# Patient Record
Sex: FEMALE | Race: WHITE | Employment: PART TIME | URBAN - METROPOLITAN AREA
[De-identification: names, ages, dates, MRNs, and addresses within clinical notes are randomized per-mention and may not be internally consistent; named-entity substitution may affect disease eponyms.]

---
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
## 2021-07-04 ENCOUNTER — APPOINTMENT (OUTPATIENT)
Dept: CT IMAGING | Age: 21
End: 2021-07-04
Attending: EMERGENCY MEDICINE
Payer: COMMERCIAL

## 2021-07-04 ENCOUNTER — APPOINTMENT (OUTPATIENT)
Dept: GENERAL RADIOLOGY | Age: 21
End: 2021-07-04
Attending: EMERGENCY MEDICINE
Payer: COMMERCIAL

## 2021-07-04 ENCOUNTER — HOSPITAL ENCOUNTER (EMERGENCY)
Age: 21
Discharge: HOME | End: 2021-07-04
Attending: EMERGENCY MEDICINE
Payer: COMMERCIAL

## 2021-07-04 VITALS
HEIGHT: 64 IN | TEMPERATURE: 100.5 F | WEIGHT: 275 LBS | RESPIRATION RATE: 16 BRPM | HEART RATE: 113 BPM | OXYGEN SATURATION: 96 % | DIASTOLIC BLOOD PRESSURE: 64 MMHG | BODY MASS INDEX: 46.95 KG/M2 | SYSTOLIC BLOOD PRESSURE: 107 MMHG

## 2021-07-04 DIAGNOSIS — I26.94 MULTIPLE SUBSEGMENTAL PULMONARY EMBOLI WITHOUT ACUTE COR PULMONALE (HCC): Primary | ICD-10-CM

## 2021-07-04 LAB
ALBUMIN SERPL-MCNC: 4 G/DL (ref 3.5–5)
ALBUMIN/GLOB SERPL: 1.1 {RATIO} (ref 1.2–3.5)
ALP SERPL-CCNC: 64 U/L (ref 50–136)
ALT SERPL-CCNC: 30 U/L (ref 12–65)
ANION GAP SERPL CALC-SCNC: 7 MMOL/L (ref 7–16)
AST SERPL-CCNC: 17 U/L (ref 15–37)
ATRIAL RATE: 105 BPM
BASOPHILS # BLD: 0 K/UL (ref 0–0.2)
BASOPHILS NFR BLD: 0 % (ref 0–2)
BILIRUB SERPL-MCNC: 0.7 MG/DL (ref 0.2–1.1)
BNP SERPL-MCNC: 28 PG/ML (ref 5–125)
BUN SERPL-MCNC: 17 MG/DL (ref 6–23)
CALCIUM SERPL-MCNC: 9.3 MG/DL (ref 8.3–10.4)
CALCULATED P AXIS, ECG09: 38 DEGREES
CALCULATED R AXIS, ECG10: 67 DEGREES
CALCULATED T AXIS, ECG11: 8 DEGREES
CHLORIDE SERPL-SCNC: 103 MMOL/L (ref 98–107)
CO2 SERPL-SCNC: 27 MMOL/L (ref 21–32)
COVID-19 RAPID TEST, COVR: NOT DETECTED
CREAT SERPL-MCNC: 0.78 MG/DL (ref 0.6–1)
DIAGNOSIS, 93000: NORMAL
DIFFERENTIAL METHOD BLD: ABNORMAL
EOSINOPHIL # BLD: 0.1 K/UL (ref 0–0.8)
EOSINOPHIL NFR BLD: 1 % (ref 0.5–7.8)
ERYTHROCYTE [DISTWIDTH] IN BLOOD BY AUTOMATED COUNT: 12.1 % (ref 11.9–14.6)
GLOBULIN SER CALC-MCNC: 3.5 G/DL (ref 2.3–3.5)
GLUCOSE SERPL-MCNC: 97 MG/DL (ref 65–100)
HCG UR QL: NEGATIVE
HCT VFR BLD AUTO: 43.6 % (ref 35.8–46.3)
HGB BLD-MCNC: 15.2 G/DL (ref 11.7–15.4)
IMM GRANULOCYTES # BLD AUTO: 0 K/UL (ref 0–0.5)
IMM GRANULOCYTES NFR BLD AUTO: 0 % (ref 0–5)
LYMPHOCYTES # BLD: 0.9 K/UL (ref 0.5–4.6)
LYMPHOCYTES NFR BLD: 6 % (ref 13–44)
MAGNESIUM SERPL-MCNC: 1.7 MG/DL (ref 1.8–2.4)
MCH RBC QN AUTO: 29.5 PG (ref 26.1–32.9)
MCHC RBC AUTO-ENTMCNC: 34.9 G/DL (ref 31.4–35)
MCV RBC AUTO: 84.7 FL (ref 79.6–97.8)
MONOCYTES # BLD: 1 K/UL (ref 0.1–1.3)
MONOCYTES NFR BLD: 7 % (ref 4–12)
NEUTS SEG # BLD: 12.3 K/UL (ref 1.7–8.2)
NEUTS SEG NFR BLD: 86 % (ref 43–78)
NRBC # BLD: 0 K/UL (ref 0–0.2)
P-R INTERVAL, ECG05: 122 MS
PLATELET # BLD AUTO: 277 K/UL (ref 150–450)
PMV BLD AUTO: 10.7 FL (ref 9.4–12.3)
POTASSIUM SERPL-SCNC: 3.7 MMOL/L (ref 3.5–5.1)
PROT SERPL-MCNC: 7.5 G/DL (ref 6.3–8.2)
Q-T INTERVAL, ECG07: 304 MS
QRS DURATION, ECG06: 80 MS
QTC CALCULATION (BEZET), ECG08: 401 MS
RBC # BLD AUTO: 5.15 M/UL (ref 4.05–5.2)
SODIUM SERPL-SCNC: 137 MMOL/L (ref 136–145)
SOURCE, COVRS: NORMAL
TSH SERPL DL<=0.005 MIU/L-ACNC: 0.64 UIU/ML (ref 0.36–3.74)
VENTRICULAR RATE, ECG03: 105 BPM
WBC # BLD AUTO: 14.3 K/UL (ref 4.3–11.1)

## 2021-07-04 PROCEDURE — 80053 COMPREHEN METABOLIC PANEL: CPT

## 2021-07-04 PROCEDURE — 96366 THER/PROPH/DIAG IV INF ADDON: CPT

## 2021-07-04 PROCEDURE — 93005 ELECTROCARDIOGRAM TRACING: CPT | Performed by: EMERGENCY MEDICINE

## 2021-07-04 PROCEDURE — 71045 X-RAY EXAM CHEST 1 VIEW: CPT

## 2021-07-04 PROCEDURE — 85025 COMPLETE CBC W/AUTO DIFF WBC: CPT

## 2021-07-04 PROCEDURE — 74011250637 HC RX REV CODE- 250/637: Performed by: EMERGENCY MEDICINE

## 2021-07-04 PROCEDURE — 81025 URINE PREGNANCY TEST: CPT

## 2021-07-04 PROCEDURE — 99285 EMERGENCY DEPT VISIT HI MDM: CPT

## 2021-07-04 PROCEDURE — 74011000636 HC RX REV CODE- 636: Performed by: EMERGENCY MEDICINE

## 2021-07-04 PROCEDURE — 81003 URINALYSIS AUTO W/O SCOPE: CPT

## 2021-07-04 PROCEDURE — 74011250636 HC RX REV CODE- 250/636: Performed by: EMERGENCY MEDICINE

## 2021-07-04 PROCEDURE — 74011000258 HC RX REV CODE- 258: Performed by: EMERGENCY MEDICINE

## 2021-07-04 PROCEDURE — 84443 ASSAY THYROID STIM HORMONE: CPT

## 2021-07-04 PROCEDURE — 83880 ASSAY OF NATRIURETIC PEPTIDE: CPT

## 2021-07-04 PROCEDURE — 87635 SARS-COV-2 COVID-19 AMP PRB: CPT

## 2021-07-04 PROCEDURE — 71260 CT THORAX DX C+: CPT

## 2021-07-04 PROCEDURE — 83735 ASSAY OF MAGNESIUM: CPT

## 2021-07-04 PROCEDURE — 96365 THER/PROPH/DIAG IV INF INIT: CPT

## 2021-07-04 RX ORDER — MAGNESIUM SULFATE HEPTAHYDRATE 40 MG/ML
2 INJECTION, SOLUTION INTRAVENOUS ONCE
Status: COMPLETED | OUTPATIENT
Start: 2021-07-04 | End: 2021-07-04

## 2021-07-04 RX ORDER — SODIUM CHLORIDE 0.9 % (FLUSH) 0.9 %
5-10 SYRINGE (ML) INJECTION EVERY 8 HOURS
Status: DISCONTINUED | OUTPATIENT
Start: 2021-07-04 | End: 2021-07-04 | Stop reason: HOSPADM

## 2021-07-04 RX ORDER — SODIUM CHLORIDE 0.9 % (FLUSH) 0.9 %
10 SYRINGE (ML) INJECTION
Status: COMPLETED | OUTPATIENT
Start: 2021-07-04 | End: 2021-07-04

## 2021-07-04 RX ORDER — SODIUM CHLORIDE 0.9 % (FLUSH) 0.9 %
5-10 SYRINGE (ML) INJECTION AS NEEDED
Status: DISCONTINUED | OUTPATIENT
Start: 2021-07-04 | End: 2021-07-04 | Stop reason: HOSPADM

## 2021-07-04 RX ORDER — RIVAROXABAN 15 MG-20MG
KIT ORAL
Qty: 1 DOSE PACK | Refills: 0 | Status: SHIPPED | OUTPATIENT
Start: 2021-07-04 | End: 2021-09-02

## 2021-07-04 RX ADMIN — Medication 10 ML: at 13:32

## 2021-07-04 RX ADMIN — RIVAROXABAN 15 MG: 15 TABLET, FILM COATED ORAL at 15:31

## 2021-07-04 RX ADMIN — IOPAMIDOL 100 ML: 755 INJECTION, SOLUTION INTRAVENOUS at 13:31

## 2021-07-04 RX ADMIN — SODIUM CHLORIDE 1000 ML: 900 INJECTION, SOLUTION INTRAVENOUS at 11:27

## 2021-07-04 RX ADMIN — MAGNESIUM SULFATE HEPTAHYDRATE 2 G: 40 INJECTION, SOLUTION INTRAVENOUS at 12:50

## 2021-07-04 RX ADMIN — SODIUM CHLORIDE 100 ML: 900 INJECTION, SOLUTION INTRAVENOUS at 13:33

## 2021-07-04 NOTE — ED PROVIDER NOTES
Skpi Sharif is a 23 y.o. female seen on 7/4/2021 in the [facility] EMERGENCY DEPT in room ER02/02. Chief Complaint   Patient presents with    Shortness of Breath    Dizziness     HPI: 76-year-old  female presented emergency department with complaints of weird sensation in her chest with associated shortness of breath, dizziness and chills. Patient recently drove back from [facility]. Shortly after she experienced left leg pain which she went to the urgent care for evaluation. Patient was given Robaxin for her symptoms. Patient states that her leg pain is gone but she woke up today with the symptoms of palpitations. Patient with no personal history of DVT or PE however patient does have a significant family history of pulmonary embolisms. Patient denies birth control pills or tobacco use. She states that she did have some GI upset last evening but that is not uncommon for her. Historian: Patient    REVIEW OF SYSTEMS     Review of Systems   Constitutional: Positive for chills and fever. HENT: Negative. Respiratory: Positive for chest tightness and shortness of breath. Cardiovascular: Positive for palpitations. Gastrointestinal: Negative. Genitourinary: Negative. Musculoskeletal: Positive for myalgias. Skin: Negative. Neurological: Negative. Psychiatric/Behavioral: The patient is nervous/anxious. All other systems reviewed and are negative. PAST MEDICAL HISTORY     No past medical history on file. No past surgical history on file.   Social History     Socioeconomic History    Marital status: SINGLE     Spouse name: Not on file    Number of children: Not on file    Years of education: Not on file    Highest education level: Not on file   Tobacco Use    Smoking status: Never Smoker   Substance and Sexual Activity    Alcohol use: No    Drug use: No    Sexual activity: Never     Social Determinants of Health Financial Resource Strain:     Difficulty of Paying Living Expenses:    Food Insecurity:     Worried About Running Out of Food in the Last Year:     [address] in the Last Year:    Transportation Needs:     Lack of Transportation (Medical):  Lack of Transportation (Non-Medical):    Physical Activity:     Days of Exercise per Week:     Minutes of Exercise per Session:    Stress:     Feeling of Stress :    Social Connections:     Frequency of Communication with Friends and Family:     Frequency of Social Gatherings with Friends and Family:     Attends Congregational Services:     Active Member of Clubs or Organizations:     Attends Club or Organization Meetings:     Marital Status:      None     No Known Allergies     PHYSICAL EXAM       Vitals:    07/04/21 1111 07/04/21 1250 07/04/21 1415   BP: (!) 134/98 (!) 128/57    Pulse: (!) 126 (!) 102 92   Resp: 16     Temp: (!) 100.5 °F (38.1 °C)     SpO2: 99%  97%    Vital signs were reviewed. Physical Exam  Vitals and nursing note reviewed. Constitutional:       General: She is not in acute distress. Appearance: She is well-developed. She is not ill-appearing or toxic-appearing. HENT:      Head: Atraumatic. Mouth/Throat:      Comments: Slightly dry mucous membranes  Eyes:      Extraocular Movements: Extraocular movements intact. Pupils: Pupils are equal, round, and reactive to light. Cardiovascular:      Rate and Rhythm: Regular rhythm. Tachycardia present. Pulmonary:      Effort: Pulmonary effort is normal.   Chest:      Chest wall: No tenderness. Abdominal:      Palpations: Abdomen is soft. Tenderness: There is no abdominal tenderness. Musculoskeletal:         General: Normal range of motion. Cervical back: Normal range of motion. Right lower leg: No tenderness. No edema. Left lower leg: No tenderness. No edema. Skin:     General: Skin is warm and dry.    Neurological:      General: No focal deficit present. Mental Status: She is alert and oriented to person, place, and time. Psychiatric:         Mood and Affect: Mood normal.         Behavior: Behavior normal.          MEDICAL DECISION MAKING     ED Course:    Orders Placed This Encounter    COVID-19 RAPID TEST    XR Chest Port    CT CHEST PULMONARY EMBOLISM    CBC    CMP    MAGNESIUM    NT-PRO BNP    TSH 3RD GENERATION    CARDIAC MONITOR - ED ONLY    PULSE OXIMETRY CONTINUOUS    POC URINE PREGNANCY TEST    POC URINE DIPSTICK    HCG URINE, QL. - POC    EKG    SALINE LOCK IV ONE TIME Routine    sodium chloride (NS) flush 5-10 mL    sodium chloride (NS) flush 5-10 mL    sodium chloride 0.9 % bolus infusion 1,000 mL    sodium chloride 0.9 % bolus infusion 100 mL    sodium chloride (NS) flush 10 mL    iopamidoL (ISOVUE-370) 76 % injection 100 mL    magnesium sulfate 2 g/50 ml IVPB (premix or compounded)    rivaroxaban (XARELTO) tablet 15 mg    rivaroxaban (Xarelto DVT-PE Treat 30d Start) 15 mg (42)- 20 mg (9) DsPk    rivaroxaban (Xarelto) 20 mg tab tablet     Recent Results (from the past 8 hour(s))   EKG, 12 LEAD, INITIAL    Collection Time: 07/04/21 11:17 AM   Result Value Ref Range    Ventricular Rate 105 BPM    Atrial Rate 105 BPM    P-R Interval 122 ms    QRS Duration 80 ms    Q-T Interval 304 ms    QTC Calculation (Bezet) 401 ms    Calculated P Axis 38 degrees    Calculated R Axis 67 degrees    Calculated T Axis 8 degrees    Diagnosis       !! AGE AND GENDER SPECIFIC ECG ANALYSIS !!   Sinus tachycardia  Otherwise normal ECG  No previous ECGs available  Confirmed by José Luis Heath ([id number]) on 7/4/2021 1:06:11 PM     CBC WITH AUTOMATED DIFF    Collection Time: 07/04/21 11:21 AM   Result Value Ref Range    WBC 14.3 (H) 4.3 - 11.1 K/uL    RBC 5.15 4.05 - 5.2 M/uL    HGB 15.2 11.7 - 15.4 g/dL    HCT 43.6 35.8 - 46.3 %    MCV 84.7 79.6 - 97.8 FL    MCH 29.5 26.1 - 32.9 PG    MCHC 34.9 31.4 - 35.0 g/dL    RDW 12.1 11.9 - 14.6 % PLATELET 846 941 - 233 K/uL    MPV 10.7 9.4 - 12.3 FL    ABSOLUTE NRBC 0.00 0.0 - 0.2 K/uL    DF AUTOMATED      NEUTROPHILS 86 (H) 43 - 78 %    LYMPHOCYTES 6 (L) 13 - 44 %    MONOCYTES 7 4.0 - 12.0 %    EOSINOPHILS 1 0.5 - 7.8 %    BASOPHILS 0 0.0 - 2.0 %    IMMATURE GRANULOCYTES 0 0.0 - 5.0 %    ABS. NEUTROPHILS 12.3 (H) 1.7 - 8.2 K/UL    ABS. LYMPHOCYTES 0.9 0.5 - 4.6 K/UL    ABS. MONOCYTES 1.0 0.1 - 1.3 K/UL    ABS. EOSINOPHILS 0.1 0.0 - 0.8 K/UL    ABS. BASOPHILS 0.0 0.0 - 0.2 K/UL    ABS. IMM. GRANS. 0.0 0.0 - 0.5 K/UL   METABOLIC PANEL, COMPREHENSIVE    Collection Time: 07/04/21 11:21 AM   Result Value Ref Range    Sodium 137 136 - 145 mmol/L    Potassium 3.7 3.5 - 5.1 mmol/L    Chloride 103 98 - 107 mmol/L    CO2 27 21 - 32 mmol/L    Anion gap 7 7 - 16 mmol/L    Glucose 97 65 - 100 mg/dL    BUN 17 6 - 23 MG/DL    Creatinine 0.78 0.6 - 1.0 MG/DL    GFR est AA >60 >60 ml/min/1.73m2    GFR est non-AA >60 >60 ml/min/1.73m2    Calcium 9.3 8.3 - 10.4 MG/DL    Bilirubin, total 0.7 0.2 - 1.1 MG/DL    ALT (SGPT) 30 12 - 65 U/L    AST (SGOT) 17 15 - 37 U/L    Alk.  phosphatase 64 50 - 136 U/L    Protein, total 7.5 6.3 - 8.2 g/dL    Albumin 4.0 3.5 - 5.0 g/dL    Globulin 3.5 2.3 - 3.5 g/dL    A-G Ratio 1.1 (L) 1.2 - 3.5     MAGNESIUM    Collection Time: 07/04/21 11:21 AM   Result Value Ref Range    Magnesium 1.7 (L) 1.8 - 2.4 mg/dL   NT-PRO BNP    Collection Time: 07/04/21 11:21 AM   Result Value Ref Range    NT pro-BNP 28 5 - 125 PG/ML   TSH 3RD GENERATION    Collection Time: 07/04/21 11:21 AM   Result Value Ref Range    TSH 0.638 0.358 - 3.740 uIU/mL   HCG URINE, QL. - POC    Collection Time: 07/04/21 12:46 PM   Result Value Ref Range    Pregnancy test,urine (POC) Negative NEG     COVID-19 RAPID TEST    Collection Time: 07/04/21  1:52 PM   Result Value Ref Range    Specimen source NASAL      COVID-19 rapid test Not detected NOTD       XR CHEST PORT    Result Date: 7/4/2021  PORTABLE CHEST, July 4, 2021 at 1122 hours CLINICAL HISTORY:  Left leg pain with shortness of breath and anxiety. COMPARISON:  None. FINDINGS:  AP image not labeled as to patient position demonstrates no confluent infiltrate or significant pleural fluid. The heart size is within normal limits without evidence of congestive heart failure or pneumothorax. The bony thorax appears intact on this view. There are overlying radiopaque support devices. NO ACUTE CARDIOPULMONARY DISEASE IDENTIFIED. CT CHEST PULMONARY EMBOLISM    Result Date: 7/4/2021  CHEST CT ANGIOGRAPHY WITH ADDITIONAL REFORMATS:  CLINICAL HISTORY:  Left leg pain with shortness of breath and anxiety. Now with mild leukocytosis and normal BNP. Clinical concern for pulmonary embolism. TECHNIQUE:  During bolus injection of nonionic intravenous contrast, the chest was scanned with spiral technique, and coronal reformats were produced. Radiation dose reduction was achieved using one or all of the following techniques: automated exposure control, weight-based dosing, iterative reconstruction. COMPARISON:  CHEST today. FINDINGS: There is soft tissue density in the lingular artery consistent with acute or subacute pulmonary embolism. No definite intraluminal soft tissue density is seen elsewhere in the pulmonary arterial tree. There is no definite pneumothorax. No pathologically enlarged lymph nodes or abnormal fluid collection is seen. The epigastrium appears unremarkable as imaged. POSITIVE LINGULAR ACUTE/SUBACUTE PULMONARY EMBOLISM. 789 The critical results contained in this report were communicated to Dr. Deshawn Umana in the ER by myself via telephone on July 4, 2021 at 1416 hours. Critical results were communicated as outlined in Section II.C.2.a.i of the ACR Practice Guideline for Communication of Diagnostic Imaging Findings. EKG interpretation: Rate 105. Sinus tachycardia. Nonspecific ST and T wave changes.       MDM  Number of Diagnoses or Management Options  Multiple subsegmental pulmonary emboli without acute cor pulmonale (HCC)  Diagnosis management comments: 65-year-old female present emergency department with palpitations, chest tightness, mild shortness of breath with low-grade temperature and tachycardia. Patient with left-sided subsegmental pulmonary embolisms likely secondary to her recent cross-country car ride. Patient's oxygen saturations are good and patient has no evidence of right heart strain. Discussed the patient, her mother and with pulmonology. Patient will be discharged home with Xarelto and will follow up with pulmonology as an outpatient. Patient will return the emergency department for significant shortness of breath or any other concerns. Amount and/or Complexity of Data Reviewed  Clinical lab tests: ordered and reviewed  Tests in the radiology section of CPT®: reviewed and ordered  Discuss the patient with other providers: yes  Independent visualization of images, tracings, or specimens: yes    Patient Progress  Patient progress: stable        Disposition: Discharge  Diagnosis:     ICD-10-CM ICD-9-CM   1. Multiple subsegmental pulmonary emboli without acute cor pulmonale ([facility]  I26.94 415.19     ____________________________________________________________________  A portion of this note was generated using voice recognition dictation software. While the note has been reviewed for accuracy, please note certain words and phrases may not be transcribed as intended and some grammatical and/or typographical errors may be present.

## 2021-07-04 NOTE — PROGRESS NOTES
Spoke with Dr. Paz Hatfield in the emergency department regarding this patient who presented with acute chest pain and shortness of breath after long travel from [facility] by car. She was found to have subsegmental pulmonary embolus. She is going to be started on Xarelto 30 mg daily for 3 weeks then 20 mg daily for the total of 3 months. She does have family history of pulmonary emboli and will need follow-up in our office with any provider in the next 3-4 weeks. Her mother is a nurse on the eighth floor at [employer] [facility] [employer] [facility] and she is going to call on Tuesday to arrange that follow-up appointment in the office. This was communicated with the emergency department.         Natali Jay MD

## 2021-07-04 NOTE — ED NOTES
I have reviewed discharge instructions with the patient. The patient verbalized understanding. Patient left ED via Discharge Method: ambulatory to Home with self. Opportunity for questions and clarification provided. Patient given 2 scripts. To continue your aftercare when you leave the hospital, you may receive an automated call from our care team to check in on how you are doing. This is a free service and part of our promise to provide the best care and service to meet your aftercare needs.  If you have questions, or wish to unsubscribe from this service please call [phone]. Thank you for Choosing our [facility] Emergency Department.

## 2021-07-06 ENCOUNTER — PATIENT OUTREACH (OUTPATIENT)
Dept: OTHER | Age: 21
End: 2021-07-06

## 2021-07-06 NOTE — PROGRESS NOTES
Patient on report as eligible for Case Management. Left discreet message on voicemail with this CM contact information. Will attempt to contact again to offer [address] Management services. Patient is 22 yo female seen in ER at [facility] on 7/4/21 for complaints of dizziness, palpitations and SOB. Patient was seen in Urgent Care on 7/2/21 for complaints of leg pain after driving to [place] from [facility] last week, diagnosed with muscle strain was treated with muscle relaxer. Patient also has family history of PE. Evaluation in ER revealed POSITIVE LINGULAR ACUTE/SUBACUTE PULMONARY EMBOLISM. Patient was started on Xeralto and discharged home to follow up with [facility] pulmonary.

## 2021-07-07 ENCOUNTER — PATIENT OUTREACH (OUTPATIENT)
Dept: OTHER | Age: 21
End: 2021-07-07

## 2021-07-07 NOTE — PROGRESS NOTES
Verified  and address for HIPAA security. Introduced [employer] for patient. Patient's mom does not identify any Care Management needs at this time and declines services. Patient is 22 yo female seen in ER at [facility] on 21 for complaints of dizziness, palpitations and SOB. Patient was seen in Urgent Care on 21 for complaints of leg pain after driving to [place] from [facility] last week, diagnosed with muscle strain was treated with muscle relaxer. Patient also has family history of PE. Evaluation in ER revealed POSITIVE LINGULAR ACUTE/SUBACUTE PULMONARY EMBOLISM. Patient was started on Xeralto and discharged home to follow up with [facility] pulmonary. ACC spoke with patient's mom, Di Wayne, Director of Nursing on 8th floor at [facility]. Reports patient is doing well, Romy Ohms with no problems, scheduled for follow up with [facility] Pulmonary and is scheduled to establish care with new PCP. Denies any needs CM needs at this time. Well aware of symptoms to report or when return to ER. States strong family history on mom's side of premature death related to PE. Plans to ask for referral to hematology for further work up. Mom has my contact information if needs arise.

## 2021-08-26 ENCOUNTER — HOSPITAL ENCOUNTER (OUTPATIENT)
Dept: ULTRASOUND IMAGING | Age: 21
Discharge: HOME | End: 2021-08-26
Attending: INTERNAL MEDICINE
Payer: COMMERCIAL

## 2021-08-26 DIAGNOSIS — I26.99 PULMONARY THROMBOEMBOLISM (HCC): ICD-10-CM

## 2021-08-26 PROCEDURE — 93970 EXTREMITY STUDY: CPT

## 2021-09-02 ENCOUNTER — HOSPITAL ENCOUNTER (OUTPATIENT)
Dept: LAB | Age: 21
Discharge: HOME | End: 2021-09-02
Payer: COMMERCIAL

## 2021-09-02 DIAGNOSIS — I26.94 MULTIPLE SUBSEGMENTAL PULMONARY EMBOLI WITHOUT ACUTE COR PULMONALE (HCC): ICD-10-CM

## 2021-09-02 DIAGNOSIS — I26.99 PULMONARY THROMBOEMBOLISM (HCC): ICD-10-CM

## 2021-09-02 LAB
ABO + RH BLD: NORMAL
ALBUMIN SERPL-MCNC: 4.1 G/DL (ref 3.5–5)
ALBUMIN/GLOB SERPL: 1 {RATIO} (ref 1.2–3.5)
ALP SERPL-CCNC: 67 U/L (ref 50–136)
ALT SERPL-CCNC: 27 U/L (ref 12–65)
ANION GAP SERPL CALC-SCNC: 5 MMOL/L (ref 7–16)
APTT 1H P INC PPP: NORMAL SEC
APTT IMM NP PPP: NORMAL SEC
APTT PPP: 33 SEC (ref 23.4–34.5)
APTT PPP: 33 SEC (ref 24.1–35.1)
AST SERPL-CCNC: 16 U/L (ref 15–37)
BASOPHILS # BLD: 0 K/UL (ref 0–0.2)
BASOPHILS NFR BLD: 0 % (ref 0–2)
BILIRUB SERPL-MCNC: 0.6 MG/DL (ref 0.2–1.1)
BUN SERPL-MCNC: 13 MG/DL (ref 6–23)
CALCIUM SERPL-MCNC: 9.1 MG/DL (ref 8.3–10.4)
CHLORIDE SERPL-SCNC: 104 MMOL/L (ref 98–107)
CO2 SERPL-SCNC: 29 MMOL/L (ref 21–32)
CREAT SERPL-MCNC: 0.9 MG/DL (ref 0.6–1)
D DIMER PPP FEU-MCNC: 0.54 UG/ML(FEU)
DIFFERENTIAL METHOD BLD: NORMAL
EOSINOPHIL # BLD: 0.3 K/UL (ref 0–0.8)
EOSINOPHIL NFR BLD: 3 % (ref 0.5–7.8)
ERYTHROCYTE [DISTWIDTH] IN BLOOD BY AUTOMATED COUNT: 12.4 % (ref 11.9–14.6)
FERRITIN SERPL-MCNC: 62 NG/ML (ref 8–388)
FIBRINOGEN PPP-MCNC: 397 MG/DL (ref 190–501)
GLOBULIN SER CALC-MCNC: 4.2 G/DL (ref 2.3–3.5)
GLUCOSE SERPL-MCNC: 75 MG/DL (ref 65–100)
HCT VFR BLD AUTO: 42.6 % (ref 35.8–46.3)
HGB BLD-MCNC: 14.7 G/DL (ref 11.7–15.4)
HGB RETIC QN AUTO: 34 PG (ref 29–35)
IMM GRANULOCYTES # BLD AUTO: 0 K/UL (ref 0–0.5)
IMM GRANULOCYTES NFR BLD AUTO: 0 % (ref 0–5)
IMM RETICS NFR: 11.9 % (ref 3–15.9)
INR PPP: 1.2
IRON SATN MFR SERPL: 24 %
IRON SERPL-MCNC: 83 UG/DL (ref 35–150)
LYMPHOCYTES # BLD: 3.3 K/UL (ref 0.5–4.6)
LYMPHOCYTES NFR BLD: 31 % (ref 13–44)
MCH RBC QN AUTO: 29.2 PG (ref 26.1–32.9)
MCHC RBC AUTO-ENTMCNC: 34.5 G/DL (ref 31.4–35)
MCV RBC AUTO: 84.7 FL (ref 79.6–97.8)
MONOCYTES # BLD: 0.5 K/UL (ref 0.1–1.3)
MONOCYTES NFR BLD: 5 % (ref 4–12)
NEUTS SEG # BLD: 6.3 K/UL (ref 1.7–8.2)
NEUTS SEG NFR BLD: 61 % (ref 43–78)
NRBC # BLD: 0 K/UL (ref 0–0.2)
PLATELET # BLD AUTO: 309 K/UL (ref 150–450)
PMV BLD AUTO: 11.1 FL (ref 9.4–12.3)
POTASSIUM SERPL-SCNC: 3.4 MMOL/L (ref 3.5–5.1)
PROT SERPL-MCNC: 8.3 G/DL (ref 6.3–8.2)
PROTHROMBIN TIME: 15.6 SEC (ref 12.6–14.5)
PTT MIXING STUDY,CMS2: NORMAL
RBC # BLD AUTO: 5.03 M/UL (ref 4.05–5.2)
REFERENCE LAB,REFLB: NORMAL
RETICS # AUTO: 0.14 M/UL (ref 0.03–0.1)
RETICS/RBC NFR AUTO: 2.9 % (ref 0.3–2)
SODIUM SERPL-SCNC: 138 MMOL/L (ref 136–145)
TEST DESCRIPTION:,ATST: NORMAL
THROMBIN TIME: 15.9 SECS (ref 15.4–17.9)
TIBC SERPL-MCNC: 341 UG/DL (ref 250–450)
WBC # BLD AUTO: 10.4 K/UL (ref 4.3–11.1)

## 2021-09-02 PROCEDURE — 86147 CARDIOLIPIN ANTIBODY EA IG: CPT

## 2021-09-02 PROCEDURE — 85379 FIBRIN DEGRADATION QUANT: CPT

## 2021-09-02 PROCEDURE — 85300 ANTITHROMBIN III ACTIVITY: CPT

## 2021-09-02 PROCEDURE — 85670 THROMBIN TIME PLASMA: CPT

## 2021-09-02 PROCEDURE — 86146 BETA-2 GLYCOPROTEIN ANTIBODY: CPT

## 2021-09-02 PROCEDURE — 85730 THROMBOPLASTIN TIME PARTIAL: CPT

## 2021-09-02 PROCEDURE — 83695 ASSAY OF LIPOPROTEIN(A): CPT

## 2021-09-02 PROCEDURE — 81240 F2 GENE: CPT

## 2021-09-02 PROCEDURE — 86038 ANTINUCLEAR ANTIBODIES: CPT

## 2021-09-02 PROCEDURE — 85613 RUSSELL VIPER VENOM DILUTED: CPT

## 2021-09-02 PROCEDURE — 80053 COMPREHEN METABOLIC PANEL: CPT

## 2021-09-02 PROCEDURE — 85302 CLOT INHIBIT PROT C ANTIGEN: CPT

## 2021-09-02 PROCEDURE — 85046 RETICYTE/HGB CONCENTRATE: CPT

## 2021-09-02 PROCEDURE — 36415 COLL VENOUS BLD VENIPUNCTURE: CPT

## 2021-09-02 PROCEDURE — 82728 ASSAY OF FERRITIN: CPT

## 2021-09-02 PROCEDURE — 85025 COMPLETE CBC W/AUTO DIFF WBC: CPT

## 2021-09-02 PROCEDURE — 83090 ASSAY OF HOMOCYSTEINE: CPT

## 2021-09-02 PROCEDURE — 85303 CLOT INHIBIT PROT C ACTIVITY: CPT

## 2021-09-02 PROCEDURE — 85306 CLOT INHIBIT PROT S FREE: CPT

## 2021-09-02 PROCEDURE — 85246 CLOT FACTOR VIII VW ANTIGEN: CPT

## 2021-09-02 PROCEDURE — 85384 FIBRINOGEN ACTIVITY: CPT

## 2021-09-02 PROCEDURE — 81241 F5 GENE: CPT

## 2021-09-02 PROCEDURE — 86769 SARS-COV-2 COVID-19 ANTIBODY: CPT

## 2021-09-02 PROCEDURE — 85610 PROTHROMBIN TIME: CPT

## 2021-09-02 PROCEDURE — 86900 BLOOD TYPING SEROLOGIC ABO: CPT

## 2021-09-02 PROCEDURE — 85305 CLOT INHIBIT PROT S TOTAL: CPT

## 2021-09-02 PROCEDURE — 83540 ASSAY OF IRON: CPT

## 2021-09-03 PROBLEM — E66.9 OBESITY: Status: ACTIVE | Noted: 2021-09-03

## 2021-09-03 PROBLEM — I26.99 PULMONARY EMBOLISM (HCC): Status: ACTIVE | Noted: 2021-09-03

## 2021-09-03 PROBLEM — Z82.49 FAMILY HISTORY OF THROMBOSIS: Status: ACTIVE | Noted: 2021-09-03

## 2021-09-03 LAB
ANA SER QL: NEGATIVE
CARDIOLIPIN IGA SER IA-ACNC: <9 APL U/ML (ref 0–11)
CARDIOLIPIN IGG SER IA-ACNC: <9 GPL U/ML (ref 0–14)
CARDIOLIPIN IGM SER IA-ACNC: 14 MPL U/ML (ref 0–12)
HCYS SERPL-SCNC: 11.4 UMOL/L (ref 0–14.5)
LPA SERPL-SCNC: 17.9 NMOL/L
SARS-COV-2 ABS, NUCLEOCAPSID: NEGATIVE

## 2021-09-04 LAB
B2 GLYCOPROT1 IGA SER-ACNC: <9 GPI IGA UNITS (ref 0–25)
B2 GLYCOPROT1 IGG SER-ACNC: <9 GPI IGG UNITS (ref 0–20)
B2 GLYCOPROT1 IGM SER-ACNC: 27 GPI IGM UNITS (ref 0–32)
PROT C AG PPP IA-ACNC: 113 % (ref 60–150)

## 2021-09-05 LAB
AT III AG PPP IA-ACNC: 80 % (ref 72–124)
AT III PPP CHRO-ACNC: 116 % (ref 75–135)
PROT S ACT/NOR PPP: 72 % (ref 63–140)
PROT S AG ACT/NOR PPP IA: 86 % (ref 60–150)
PROT S FREE AG ACT/NOR PPP IA: 139 % (ref 57–157)

## 2021-09-07 LAB
DRVVT MIX, 117894: 40.7 SEC (ref 0–40.4)
DRVVT RATIO 500585: 1.2 RATIO (ref 0.8–1.2)
F2 GENE MUT ANL BLD/T: NORMAL
F5 GENE MUT ANL BLD/T: NORMAL
FACT VIII ACT/NOR PPP: 105 % (ref 56–140)
INTERPRETATION, 117893: ABNORMAL
INTERPRETATION, 910378, CSIR1: NORMAL
SCREEN APTT: 42.3 SEC (ref 0–51.9)
SCREEN DRVVT: 70.3 SEC (ref 0–47)
VWF AG ACT/NOR PPP IA: 159 % (ref 50–200)
VWF:RCO ACT/NOR PPP PL AGG: 138 % (ref 50–200)

## 2021-09-08 LAB — PROT C ACT/NOR PPP CHRO: 145 %

## 2021-09-16 ENCOUNTER — HOSPITAL ENCOUNTER (OUTPATIENT)
Dept: CT IMAGING | Age: 21
Discharge: HOME | End: 2021-09-16
Attending: PEDIATRICS
Payer: COMMERCIAL

## 2021-09-16 DIAGNOSIS — I26.94 MULTIPLE SUBSEGMENTAL PULMONARY EMBOLI WITHOUT ACUTE COR PULMONALE (HCC): ICD-10-CM

## 2021-09-16 DIAGNOSIS — I26.99 PULMONARY THROMBOEMBOLISM (HCC): ICD-10-CM

## 2021-09-16 PROCEDURE — 74174 CTA ABD&PLVS W/CONTRAST: CPT

## 2021-09-16 PROCEDURE — 74011000636 HC RX REV CODE- 636: Performed by: PEDIATRICS

## 2021-09-16 PROCEDURE — 74011000258 HC RX REV CODE- 258: Performed by: PEDIATRICS

## 2021-09-16 RX ORDER — SODIUM CHLORIDE 0.9 % (FLUSH) 0.9 %
10 SYRINGE (ML) INJECTION
Status: COMPLETED | OUTPATIENT
Start: 2021-09-16 | End: 2021-09-16

## 2021-09-16 RX ADMIN — Medication 10 ML: at 10:58

## 2021-09-16 RX ADMIN — SODIUM CHLORIDE 100 ML: 900 INJECTION, SOLUTION INTRAVENOUS at 10:58

## 2021-09-16 RX ADMIN — IOPAMIDOL 100 ML: 755 INJECTION, SOLUTION INTRAVENOUS at 10:57

## 2021-11-16 ENCOUNTER — HOSPITAL ENCOUNTER (OUTPATIENT)
Dept: LAB | Age: 21
Discharge: HOME | End: 2021-11-16
Payer: COMMERCIAL

## 2021-11-16 DIAGNOSIS — I26.99 PULMONARY THROMBOEMBOLISM (HCC): ICD-10-CM

## 2021-11-16 LAB
ALBUMIN SERPL-MCNC: 3.8 G/DL (ref 3.5–5)
ALBUMIN/GLOB SERPL: 1 {RATIO} (ref 1.2–3.5)
ALP SERPL-CCNC: 66 U/L (ref 50–136)
ALT SERPL-CCNC: 35 U/L (ref 12–65)
ANION GAP SERPL CALC-SCNC: 5 MMOL/L (ref 7–16)
AST SERPL-CCNC: 14 U/L (ref 15–37)
BASOPHILS # BLD: 0.1 K/UL (ref 0–0.2)
BASOPHILS NFR BLD: 1 % (ref 0–2)
BILIRUB SERPL-MCNC: 0.5 MG/DL (ref 0.2–1.1)
BUN SERPL-MCNC: 14 MG/DL (ref 6–23)
CALCIUM SERPL-MCNC: 9.4 MG/DL (ref 8.3–10.4)
CHLORIDE SERPL-SCNC: 103 MMOL/L (ref 98–107)
CO2 SERPL-SCNC: 30 MMOL/L (ref 21–32)
CREAT SERPL-MCNC: 0.9 MG/DL (ref 0.6–1)
DIFFERENTIAL METHOD BLD: ABNORMAL
EOSINOPHIL # BLD: 0.5 K/UL (ref 0–0.8)
EOSINOPHIL NFR BLD: 5 % (ref 0.5–7.8)
ERYTHROCYTE [DISTWIDTH] IN BLOOD BY AUTOMATED COUNT: 12.1 % (ref 11.9–14.6)
GLOBULIN SER CALC-MCNC: 3.8 G/DL (ref 2.3–3.5)
GLUCOSE SERPL-MCNC: 82 MG/DL (ref 65–100)
HCT VFR BLD AUTO: 43.5 % (ref 35.8–46.3)
HGB BLD-MCNC: 14.8 G/DL (ref 11.7–15.4)
IMM GRANULOCYTES # BLD AUTO: 0 K/UL (ref 0–0.5)
IMM GRANULOCYTES NFR BLD AUTO: 0 % (ref 0–5)
LYMPHOCYTES # BLD: 3 K/UL (ref 0.5–4.6)
LYMPHOCYTES NFR BLD: 30 % (ref 13–44)
MCH RBC QN AUTO: 28.1 PG (ref 26.1–32.9)
MCHC RBC AUTO-ENTMCNC: 34 G/DL (ref 31.4–35)
MCV RBC AUTO: 82.7 FL (ref 79.6–97.8)
MONOCYTES # BLD: 0.6 K/UL (ref 0.1–1.3)
MONOCYTES NFR BLD: 6 % (ref 4–12)
NEUTS SEG # BLD: 5.8 K/UL (ref 1.7–8.2)
NEUTS SEG NFR BLD: 58 % (ref 43–78)
NRBC # BLD: 0 K/UL (ref 0–0.2)
PLATELET # BLD AUTO: 292 K/UL (ref 150–450)
PMV BLD AUTO: 10.6 FL (ref 9.4–12.3)
POTASSIUM SERPL-SCNC: 4 MMOL/L (ref 3.5–5.1)
PROT SERPL-MCNC: 7.6 G/DL (ref 6.3–8.2)
RBC # BLD AUTO: 5.26 M/UL (ref 4.05–5.2)
SODIUM SERPL-SCNC: 138 MMOL/L (ref 136–145)
WBC # BLD AUTO: 10.1 K/UL (ref 4.3–11.1)

## 2021-11-16 PROCEDURE — 85025 COMPLETE CBC W/AUTO DIFF WBC: CPT

## 2021-11-16 PROCEDURE — 36415 COLL VENOUS BLD VENIPUNCTURE: CPT

## 2021-11-16 PROCEDURE — 80053 COMPREHEN METABOLIC PANEL: CPT

## 2021-12-10 ENCOUNTER — HOSPITAL ENCOUNTER (OUTPATIENT)
Dept: LAB | Age: 21
Discharge: HOME | End: 2021-12-10
Payer: COMMERCIAL

## 2021-12-10 DIAGNOSIS — I26.99 PULMONARY THROMBOEMBOLISM (HCC): ICD-10-CM

## 2021-12-10 LAB
ALBUMIN SERPL-MCNC: 3.7 G/DL (ref 3.5–5)
ALBUMIN/GLOB SERPL: 1.1 {RATIO} (ref 1.2–3.5)
ALP SERPL-CCNC: 58 U/L (ref 50–136)
ALT SERPL-CCNC: 40 U/L (ref 12–65)
ANION GAP SERPL CALC-SCNC: 5 MMOL/L (ref 7–16)
AST SERPL-CCNC: 20 U/L (ref 15–37)
BASOPHILS # BLD: 0.1 K/UL (ref 0–0.2)
BASOPHILS NFR BLD: 1 % (ref 0–2)
BILIRUB SERPL-MCNC: 0.6 MG/DL (ref 0.2–1.1)
BUN SERPL-MCNC: 18 MG/DL (ref 6–23)
CALCIUM SERPL-MCNC: 9.5 MG/DL (ref 8.3–10.4)
CHLORIDE SERPL-SCNC: 103 MMOL/L (ref 98–107)
CO2 SERPL-SCNC: 28 MMOL/L (ref 21–32)
CREAT SERPL-MCNC: 0.9 MG/DL (ref 0.6–1)
DIFFERENTIAL METHOD BLD: NORMAL
EOSINOPHIL # BLD: 0.4 K/UL (ref 0–0.8)
EOSINOPHIL NFR BLD: 4 % (ref 0.5–7.8)
ERYTHROCYTE [DISTWIDTH] IN BLOOD BY AUTOMATED COUNT: 12.3 % (ref 11.9–14.6)
GLOBULIN SER CALC-MCNC: 3.5 G/DL (ref 2.3–3.5)
GLUCOSE SERPL-MCNC: 86 MG/DL (ref 65–100)
HCT VFR BLD AUTO: 42.8 % (ref 35.8–46.3)
HGB BLD-MCNC: 14.6 G/DL (ref 11.7–15.4)
IMM GRANULOCYTES # BLD AUTO: 0 K/UL (ref 0–0.5)
IMM GRANULOCYTES NFR BLD AUTO: 0 % (ref 0–5)
LYMPHOCYTES # BLD: 4.3 K/UL (ref 0.5–4.6)
LYMPHOCYTES NFR BLD: 39 % (ref 13–44)
MCH RBC QN AUTO: 28.6 PG (ref 26.1–32.9)
MCHC RBC AUTO-ENTMCNC: 34.1 G/DL (ref 31.4–35)
MCV RBC AUTO: 83.9 FL (ref 79.6–97.8)
MONOCYTES # BLD: 0.8 K/UL (ref 0.1–1.3)
MONOCYTES NFR BLD: 8 % (ref 4–12)
NEUTS SEG # BLD: 5.5 K/UL (ref 1.7–8.2)
NEUTS SEG NFR BLD: 49 % (ref 43–78)
NRBC # BLD: 0 K/UL (ref 0–0.2)
PLATELET # BLD AUTO: 342 K/UL (ref 150–450)
PMV BLD AUTO: 11 FL (ref 9.4–12.3)
POTASSIUM SERPL-SCNC: 3.5 MMOL/L (ref 3.5–5.1)
PROT SERPL-MCNC: 7.2 G/DL (ref 6.3–8.2)
RBC # BLD AUTO: 5.1 M/UL (ref 4.05–5.2)
SODIUM SERPL-SCNC: 136 MMOL/L (ref 136–145)
WBC # BLD AUTO: 11.1 K/UL (ref 4.3–11.1)

## 2021-12-10 PROCEDURE — 80053 COMPREHEN METABOLIC PANEL: CPT

## 2021-12-10 PROCEDURE — 86147 CARDIOLIPIN ANTIBODY EA IG: CPT

## 2021-12-10 PROCEDURE — 85025 COMPLETE CBC W/AUTO DIFF WBC: CPT

## 2021-12-10 PROCEDURE — 36415 COLL VENOUS BLD VENIPUNCTURE: CPT

## 2021-12-13 LAB
CARDIOLIPIN IGA SER IA-ACNC: <9 APL U/ML (ref 0–11)
CARDIOLIPIN IGG SER IA-ACNC: <9 GPL U/ML (ref 0–14)
CARDIOLIPIN IGM SER IA-ACNC: 10 MPL U/ML (ref 0–12)

## 2022-02-28 ENCOUNTER — APPOINTMENT (OUTPATIENT)
Dept: GENERAL RADIOLOGY | Age: 22
End: 2022-02-28
Attending: EMERGENCY MEDICINE
Payer: COMMERCIAL

## 2022-02-28 ENCOUNTER — APPOINTMENT (OUTPATIENT)
Dept: CT IMAGING | Age: 22
End: 2022-02-28
Attending: STUDENT IN AN ORGANIZED HEALTH CARE EDUCATION/TRAINING PROGRAM
Payer: COMMERCIAL

## 2022-02-28 ENCOUNTER — HOSPITAL ENCOUNTER (EMERGENCY)
Age: 22
Discharge: HOME | End: 2022-03-01
Attending: STUDENT IN AN ORGANIZED HEALTH CARE EDUCATION/TRAINING PROGRAM
Payer: COMMERCIAL

## 2022-02-28 DIAGNOSIS — J20.9 ACUTE BRONCHITIS, UNSPECIFIED ORGANISM: Primary | ICD-10-CM

## 2022-02-28 LAB
ALBUMIN SERPL-MCNC: 3.8 G/DL (ref 3.5–5)
ALBUMIN/GLOB SERPL: 1 {RATIO} (ref 1.2–3.5)
ALP SERPL-CCNC: 63 U/L (ref 50–136)
ALT SERPL-CCNC: 48 U/L (ref 12–65)
ANION GAP SERPL CALC-SCNC: 4 MMOL/L (ref 7–16)
AST SERPL-CCNC: 23 U/L (ref 15–37)
ATRIAL RATE: 114 BPM
BASOPHILS # BLD: 0.1 K/UL (ref 0–0.2)
BASOPHILS NFR BLD: 0 % (ref 0–2)
BILIRUB SERPL-MCNC: 0.7 MG/DL (ref 0.2–1.1)
BUN SERPL-MCNC: 12 MG/DL (ref 6–23)
CALCIUM SERPL-MCNC: 9.3 MG/DL (ref 8.3–10.4)
CALCULATED P AXIS, ECG09: 68 DEGREES
CALCULATED R AXIS, ECG10: 79 DEGREES
CALCULATED T AXIS, ECG11: 51 DEGREES
CHLORIDE SERPL-SCNC: 104 MMOL/L (ref 98–107)
CO2 SERPL-SCNC: 30 MMOL/L (ref 21–32)
COVID-19 RAPID TEST, COVR: NOT DETECTED
CREAT SERPL-MCNC: 0.8 MG/DL (ref 0.6–1)
D DIMER PPP FEU-MCNC: 0.62 UG/ML(FEU)
DIAGNOSIS, 93000: NORMAL
DIFFERENTIAL METHOD BLD: ABNORMAL
EOSINOPHIL # BLD: 0.3 K/UL (ref 0–0.8)
EOSINOPHIL NFR BLD: 3 % (ref 0.5–7.8)
ERYTHROCYTE [DISTWIDTH] IN BLOOD BY AUTOMATED COUNT: 12.4 % (ref 11.9–14.6)
GLOBULIN SER CALC-MCNC: 4 G/DL (ref 2.3–3.5)
GLUCOSE SERPL-MCNC: 88 MG/DL (ref 65–100)
HCT VFR BLD AUTO: 44.8 % (ref 35.8–46.3)
HGB BLD-MCNC: 15.3 G/DL (ref 11.7–15.4)
IMM GRANULOCYTES # BLD AUTO: 0 K/UL (ref 0–0.5)
IMM GRANULOCYTES NFR BLD AUTO: 0 % (ref 0–5)
LYMPHOCYTES # BLD: 1.8 K/UL (ref 0.5–4.6)
LYMPHOCYTES NFR BLD: 16 % (ref 13–44)
MAGNESIUM SERPL-MCNC: 2.1 MG/DL (ref 1.8–2.4)
MCH RBC QN AUTO: 28.9 PG (ref 26.1–32.9)
MCHC RBC AUTO-ENTMCNC: 34.2 G/DL (ref 31.4–35)
MCV RBC AUTO: 84.7 FL (ref 79.6–97.8)
MONOCYTES # BLD: 0.6 K/UL (ref 0.1–1.3)
MONOCYTES NFR BLD: 5 % (ref 4–12)
NEUTS SEG # BLD: 8.6 K/UL (ref 1.7–8.2)
NEUTS SEG NFR BLD: 76 % (ref 43–78)
NRBC # BLD: 0 K/UL (ref 0–0.2)
P-R INTERVAL, ECG05: 120 MS
PLATELET # BLD AUTO: 299 K/UL (ref 150–450)
PMV BLD AUTO: 11 FL (ref 9.4–12.3)
POTASSIUM SERPL-SCNC: 3.7 MMOL/L (ref 3.5–5.1)
PROT SERPL-MCNC: 7.8 G/DL (ref 6.3–8.2)
Q-T INTERVAL, ECG07: 316 MS
QRS DURATION, ECG06: 84 MS
QTC CALCULATION (BEZET), ECG08: 435 MS
RBC # BLD AUTO: 5.29 M/UL (ref 4.05–5.2)
SODIUM SERPL-SCNC: 138 MMOL/L (ref 136–145)
SOURCE, COVRS: NORMAL
VENTRICULAR RATE, ECG03: 114 BPM
WBC # BLD AUTO: 11.3 K/UL (ref 4.3–11.1)

## 2022-02-28 PROCEDURE — 0202U NFCT DS 22 TRGT SARS-COV-2: CPT

## 2022-02-28 PROCEDURE — 85025 COMPLETE CBC W/AUTO DIFF WBC: CPT

## 2022-02-28 PROCEDURE — 74011000250 HC RX REV CODE- 250: Performed by: STUDENT IN AN ORGANIZED HEALTH CARE EDUCATION/TRAINING PROGRAM

## 2022-02-28 PROCEDURE — 83735 ASSAY OF MAGNESIUM: CPT

## 2022-02-28 PROCEDURE — 93005 ELECTROCARDIOGRAM TRACING: CPT | Performed by: EMERGENCY MEDICINE

## 2022-02-28 PROCEDURE — 87635 SARS-COV-2 COVID-19 AMP PRB: CPT

## 2022-02-28 PROCEDURE — 74011250636 HC RX REV CODE- 250/636: Performed by: STUDENT IN AN ORGANIZED HEALTH CARE EDUCATION/TRAINING PROGRAM

## 2022-02-28 PROCEDURE — 99285 EMERGENCY DEPT VISIT HI MDM: CPT

## 2022-02-28 PROCEDURE — 96374 THER/PROPH/DIAG INJ IV PUSH: CPT

## 2022-02-28 PROCEDURE — 85379 FIBRIN DEGRADATION QUANT: CPT

## 2022-02-28 PROCEDURE — 96361 HYDRATE IV INFUSION ADD-ON: CPT

## 2022-02-28 PROCEDURE — 94762 N-INVAS EAR/PLS OXIMTRY CONT: CPT

## 2022-02-28 PROCEDURE — 93005 ELECTROCARDIOGRAM TRACING: CPT | Performed by: STUDENT IN AN ORGANIZED HEALTH CARE EDUCATION/TRAINING PROGRAM

## 2022-02-28 PROCEDURE — 71260 CT THORAX DX C+: CPT

## 2022-02-28 PROCEDURE — 71045 X-RAY EXAM CHEST 1 VIEW: CPT

## 2022-02-28 PROCEDURE — 74011000258 HC RX REV CODE- 258: Performed by: STUDENT IN AN ORGANIZED HEALTH CARE EDUCATION/TRAINING PROGRAM

## 2022-02-28 PROCEDURE — 94664 DEMO&/EVAL PT USE INHALER: CPT

## 2022-02-28 PROCEDURE — 74011000636 HC RX REV CODE- 636: Performed by: STUDENT IN AN ORGANIZED HEALTH CARE EDUCATION/TRAINING PROGRAM

## 2022-02-28 PROCEDURE — 94640 AIRWAY INHALATION TREATMENT: CPT

## 2022-02-28 PROCEDURE — 80053 COMPREHEN METABOLIC PANEL: CPT

## 2022-02-28 RX ORDER — PREDNISONE 20 MG/1
40 TABLET ORAL DAILY
Qty: 8 TABLET | Refills: 0 | Status: SHIPPED | OUTPATIENT
Start: 2022-02-28 | End: 2022-03-01 | Stop reason: SDUPTHER

## 2022-02-28 RX ORDER — IPRATROPIUM BROMIDE AND ALBUTEROL SULFATE 2.5; .5 MG/3ML; MG/3ML
3 SOLUTION RESPIRATORY (INHALATION)
Status: COMPLETED | OUTPATIENT
Start: 2022-02-28 | End: 2022-02-28

## 2022-02-28 RX ORDER — SODIUM CHLORIDE 0.9 % (FLUSH) 0.9 %
10 SYRINGE (ML) INJECTION
Status: COMPLETED | OUTPATIENT
Start: 2022-02-28 | End: 2022-02-28

## 2022-02-28 RX ORDER — SODIUM CHLORIDE 0.9 % (FLUSH) 0.9 %
5-10 SYRINGE (ML) INJECTION EVERY 8 HOURS
Status: DISCONTINUED | OUTPATIENT
Start: 2022-02-28 | End: 2022-02-28

## 2022-02-28 RX ORDER — ALBUTEROL SULFATE 90 UG/1
2 AEROSOL, METERED RESPIRATORY (INHALATION)
Qty: 6.7 G | Refills: 2 | Status: SHIPPED | OUTPATIENT
Start: 2022-02-28 | End: 2022-03-01 | Stop reason: SDUPTHER

## 2022-02-28 RX ORDER — SODIUM CHLORIDE 0.9 % (FLUSH) 0.9 %
5-10 SYRINGE (ML) INJECTION AS NEEDED
Status: DISCONTINUED | OUTPATIENT
Start: 2022-02-28 | End: 2022-03-01 | Stop reason: HOSPADM

## 2022-02-28 RX ADMIN — SODIUM CHLORIDE 1000 ML: 900 INJECTION, SOLUTION INTRAVENOUS at 18:55

## 2022-02-28 RX ADMIN — Medication 10 ML: at 20:08

## 2022-02-28 RX ADMIN — IOPAMIDOL 100 ML: 755 INJECTION, SOLUTION INTRAVENOUS at 20:08

## 2022-02-28 RX ADMIN — IPRATROPIUM BROMIDE AND ALBUTEROL SULFATE 3 ML: .5; 3 SOLUTION RESPIRATORY (INHALATION) at 20:14

## 2022-02-28 RX ADMIN — SODIUM CHLORIDE 100 ML: 9 INJECTION, SOLUTION INTRAVENOUS at 20:08

## 2022-02-28 RX ADMIN — SODIUM CHLORIDE 1000 ML: 900 INJECTION, SOLUTION INTRAVENOUS at 22:29

## 2022-02-28 RX ADMIN — METHYLPREDNISOLONE SODIUM SUCCINATE 125 MG: 125 INJECTION, POWDER, FOR SOLUTION INTRAMUSCULAR; INTRAVENOUS at 18:54

## 2022-02-28 RX ADMIN — IPRATROPIUM BROMIDE AND ALBUTEROL SULFATE 3 ML: .5; 3 SOLUTION RESPIRATORY (INHALATION) at 18:16

## 2022-02-28 NOTE — ED PROVIDER NOTES
79-year-old female patient presenting to the emergency department with reports of worsening shortness of breath, wheezing and cough. Patient states symptoms started yesterday. She reports progression of symptoms overnight prompting a visit to urgent care today. She reports a history of pulmonary embolus in the past following an extended car ride. She denies any recent surgery, extended travel at this time. She has since come off of anticoagulants. She reports no chest pain or pressure but does report some tightness/muscle aches generally through the chest.  Patient denies fever or chills. She denies hemoptysis or hematemesis. Patient does not take any medications. She was not vaccinated for COVID-19. She denies known sick contacts. Mom at bedside, who works in this hospital reports history of seasonal allergies but notes diagnosed asthma or other lung disease           Past Medical History:   Diagnosis Date    Asthma     Pulmonary emboli ([facility] [address] 2021    after long car ride       No past surgical history on file.       Family History:   Problem Relation Age of Onset    Pulmonary Embolism Maternal Grandmother 32         of PE    Other Paternal Grandmother 48        bypass    Pulmonary Embolism Maternal Great Grandmother 47         of PE    Pulmonary Embolism Cousin 32         of PE    Deep Vein Thrombosis Maternal Aunt        Social History     Socioeconomic History    Marital status: SINGLE     Spouse name: Not on file    Number of children: Not on file    Years of education: Not on file    Highest education level: Not on file   Occupational History    Not on file   Tobacco Use    Smoking status: Never Smoker    Smokeless tobacco: Never Used   Substance and Sexual Activity    Alcohol use: No    Drug use: No    Sexual activity: Never   Other Topics Concern    Not on file   Social History Narrative    Not on file     Social Determinants of Health     Financial Resource Strain:     Difficulty of Paying Living Expenses: Not on file   Food Insecurity:     Worried About Running Out of Food in the Last Year: Not on file    Hima of Food in the Last Year: Not on file   Transportation Needs:     Lack of Transportation (Medical): Not on file    Lack of Transportation (Non-Medical): Not on file   Physical Activity:     Days of Exercise per Week: Not on file    Minutes of Exercise per Session: Not on file   Stress:     Feeling of Stress : Not on file   Social Connections:     Frequency of Communication with Friends and Family: Not on file    Frequency of Social Gatherings with Friends and Family: Not on file    Attends Adventism Services: Not on file    Active Member of [address] [place] [address] [employer] or Organizations: Not on file    Attends Club or Organization Meetings: Not on file    Marital Status: Not on file   Intimate Partner Violence:     Fear of Current or Ex-Partner: Not on file    Emotionally Abused: Not on file    Physically Abused: Not on file    Sexually Abused: Not on file   Housing Stability:     Unable to Pay for Housing in the Last Year: Not on file    Number of [place] in the Last Year: Not on file    Unstable Housing in the Last Year: Not on file         ALLERGIES: Patient has no known allergies. Review of Systems   Constitutional: Negative for chills, diaphoresis and fever. HENT: Negative for congestion, sneezing and sore throat. Eyes: Negative for visual disturbance. Respiratory: Positive for cough, chest tightness and shortness of breath. Negative for wheezing. Cardiovascular: Negative for chest pain and leg swelling. Gastrointestinal: Negative for abdominal pain, blood in stool, diarrhea, nausea and vomiting. Endocrine: Negative for polyuria. Genitourinary: Negative for difficulty urinating, dysuria, flank pain, hematuria and urgency. Musculoskeletal: Negative for back pain, myalgias, neck pain and neck stiffness.    Skin: Negative for color change and rash. Neurological: Negative for dizziness, syncope, speech difficulty, weakness, light-headedness, numbness and headaches. Psychiatric/Behavioral: Negative for behavioral problems. All other systems reviewed and are negative. Vitals:    02/28/22 1653 02/28/22 1729 02/28/22 1730 02/28/22 1745   BP: (!) 143/89 107/81 111/83    Pulse: (!) 115 99  (!) 104   Resp: 20   12   Temp: 99.7 °F (37.6 °C)      SpO2: 96% 93% 93% 95%            Physical Exam  Vitals and nursing note reviewed. Constitutional:       General: She is not in acute distress. Appearance: Normal appearance. She is well-developed. She is not diaphoretic. Comments: Generally well-appearing female patient alert and oriented to person place and time. No acute distress, speaks in clear, fluid sentences. HENT:      Head: Normocephalic and atraumatic. Right Ear: External ear normal.      Left Ear: External ear normal.      Nose: Nose normal.      Mouth/Throat:      Mouth: Mucous membranes are dry. Eyes:      Extraocular Movements: Extraocular movements intact. Pupils: Pupils are equal, round, and reactive to light. Cardiovascular:      Rate and Rhythm: Normal rate and regular rhythm. Pulses: Normal pulses. Heart sounds: Normal heart sounds. No murmur heard. No friction rub. No gallop. Pulmonary:      Effort: Pulmonary effort is normal. Tachypnea present. No respiratory distress. Breath sounds: No stridor. Examination of the right-upper field reveals wheezing. Examination of the left-upper field reveals wheezing. Examination of the right-middle field reveals wheezing. Examination of the left-middle field reveals wheezing. Examination of the right-lower field reveals wheezing. Examination of the left-lower field reveals wheezing. Decreased breath sounds and wheezing present. No rhonchi or rales. Comments: Diffuse wheezing audible without the aid of stethoscope.   Diminished throughout  Chest:      Chest wall: No tenderness. Abdominal:      General: Abdomen is flat. There is no distension. Hernia: No hernia is present. Musculoskeletal:         General: No tenderness or deformity. Normal range of motion. Cervical back: Normal range of motion. Skin:     General: Skin is warm and dry. Capillary Refill: Capillary refill takes less than 2 seconds. Neurological:      General: No focal deficit present. Mental Status: She is alert and oriented to person, place, and time. Cranial Nerves: No cranial nerve deficit. Psychiatric:         Mood and Affect: Mood normal.          MDM  Number of Diagnoses or Management Options  Acute bronchitis, unspecified organism: new and requires workup  Diagnosis management comments: Chest x-ray, CT for PE are normal and stable. Patient's breathing is much improved after 2 DuoNeb treatments here. She also received Solu-Medrol. Patient has remained tachycardic intermittently but improved with rest.  She also improved with some fluids. Covid rapid swab was negative. Patient's labs are stable. Mom who works as a pulmonary nurse in this hospital states that patient has a history of a \"reactive heart rate\". She has been seen by cardiology in the past for recurrent tachycardias with a little effort. This option for admission for further monitoring and steroids/albuterol treatment. Patient and family wished to go home and state they have a pulse oximeter to use at home. Requesting refill of rescue inhaler. Discussed plan for short course of steroids as well. Respiratory viral panel is pending. Will instruct patient to verify results via [address] Patient and family agree to return immediately should symptoms worsen or fail to improve. Instruction given for follow-up this week with primary care    Voice dictation software was used during the making of this note.   This software is not perfect and grammatical and other typographical errors may be present. This note has been proofread, but may still contain errors. 601 Doctor Mahesh Chapman [facility], DO; 2/28/2022 @10:15 PM   ===================================================================         Amount and/or Complexity of Data Reviewed  Clinical lab tests: ordered and reviewed  Tests in the radiology section of CPT®: reviewed and ordered  Tests in the medicine section of CPT®: ordered and reviewed  Independent visualization of images, tracings, or specimens: yes    Risk of Complications, Morbidity, and/or Mortality  Presenting problems: moderate  Diagnostic procedures: low  Management options: moderate    Patient Progress  Patient progress: stable    ED Course as of 02/28/22 2213 Mon Feb 28, 2022 1809 Discussed option for D-dimer testing. Patient and family agreeable with this plan  Labs are stable, Covid swab negative, x-ray pending [BR]   1912 Wheezing improved, continues to have some shortness of breath, saturations of 92 to 93%, tachycardia improved as well. [BR]   1954 Abnormal D-dimer, will obtain CT imaging to rule out PE [BR]   2106 Patient CT is unremarkable. She remains borderline hypoxic with sats of 92 to 93% on room air, at rest.  She is tachycardic following her DuoNeb treatment. She does feel better. We will give patient time for tachycardia did resolve following treatment and ambulate we will plan to recheck vital signs. In addition I have added respiratory viral panel with PCR Covid testing as patient is unvaccinated [BR]   2202 Subsequent EKG interpretation: Sinus tachycardia, rate of 121, normal axis, no ischemia. [BR]   2203 Ambulation test reveals stable oxygen saturations between 93 and 95 however patient's heart rate jumped to 140.  [BR]      ED Course User Index  [BR] Valeria Marinelli DO       Procedures

## 2022-02-28 NOTE — ED TRIAGE NOTES
Patient arrives ambulatory to triage with mask in place. Reports onset of headache, sore throat, runny nose that began on Saturday. Patient reports shortness of breath began last night. Reports going to urgent care but was sent to ER. Reports wheezing and doesn't currently have an inhaler. Had covid test and strep today but did not get results. Reports hx PE last July and completed xarelto in December.

## 2022-03-01 VITALS
SYSTOLIC BLOOD PRESSURE: 112 MMHG | DIASTOLIC BLOOD PRESSURE: 63 MMHG | TEMPERATURE: 99.7 F | RESPIRATION RATE: 12 BRPM | OXYGEN SATURATION: 93 % | HEART RATE: 114 BPM

## 2022-03-01 LAB
ATRIAL RATE: 121 BPM
B PERT DNA SPEC QL NAA+PROBE: NOT DETECTED
BORDETELLA PARAPERTUSSIS PCR, BORPAR: NOT DETECTED
C PNEUM DNA SPEC QL NAA+PROBE: NOT DETECTED
CALCULATED P AXIS, ECG09: 63 DEGREES
CALCULATED R AXIS, ECG10: 76 DEGREES
CALCULATED T AXIS, ECG11: -19 DEGREES
DIAGNOSIS, 93000: NORMAL
FLUAV SUBTYP SPEC NAA+PROBE: NOT DETECTED
FLUBV RNA SPEC QL NAA+PROBE: NOT DETECTED
HADV DNA SPEC QL NAA+PROBE: NOT DETECTED
HCOV 229E RNA SPEC QL NAA+PROBE: NOT DETECTED
HCOV HKU1 RNA SPEC QL NAA+PROBE: NOT DETECTED
HCOV NL63 RNA SPEC QL NAA+PROBE: NOT DETECTED
HCOV OC43 RNA SPEC QL NAA+PROBE: NOT DETECTED
HMPV RNA SPEC QL NAA+PROBE: NOT DETECTED
HPIV1 RNA SPEC QL NAA+PROBE: NOT DETECTED
HPIV2 RNA SPEC QL NAA+PROBE: NOT DETECTED
HPIV3 RNA SPEC QL NAA+PROBE: NOT DETECTED
HPIV4 RNA SPEC QL NAA+PROBE: NOT DETECTED
M PNEUMO DNA SPEC QL NAA+PROBE: NOT DETECTED
P-R INTERVAL, ECG05: 130 MS
Q-T INTERVAL, ECG07: 327 MS
QRS DURATION, ECG06: 84 MS
QTC CALCULATION (BEZET), ECG08: 464 MS
RSV RNA SPEC QL NAA+PROBE: NOT DETECTED
RV+EV RNA SPEC QL NAA+PROBE: DETECTED
SARS-COV-2 PCR, COVPCR: NOT DETECTED
VENTRICULAR RATE, ECG03: 121 BPM

## 2022-03-01 RX ORDER — ALBUTEROL SULFATE 90 UG/1
2 AEROSOL, METERED RESPIRATORY (INHALATION)
Qty: 6.7 G | Refills: 2 | Status: SHIPPED | OUTPATIENT
Start: 2022-03-01

## 2022-03-01 RX ORDER — PREDNISONE 20 MG/1
40 TABLET ORAL DAILY
Qty: 8 TABLET | Refills: 0 | Status: SHIPPED | OUTPATIENT
Start: 2022-03-01 | End: 2022-03-05

## 2022-03-01 NOTE — ED NOTES
Pt resting in bed with lights off. Eyes closed. sats 89%RA while sleeping. MD made aware, if pt and family comfortable with pt being discharged to monitor at home, will continue to discharge.

## 2022-03-01 NOTE — ED NOTES
Report to [facility]. [facility] RN to assume care of this pt at this time.

## 2022-03-01 NOTE — ED NOTES
Ambulatory in hallway with 02 and heart rate monitors in place. Heart rate consistent in 140s while ambulating, 02 sats 95% RA. Denies increased shortness of breat while ambulating. md made aware.

## 2022-03-02 ENCOUNTER — PATIENT OUTREACH (OUTPATIENT)
Dept: OTHER | Age: 22
End: 2022-03-02

## 2022-03-02 NOTE — PROGRESS NOTES
Patient identified as eligible for Employee Care Management with [employer]. Care Manager contacted the patient, verified  and zip code as identifiers. Provided introduction and explanation of the Nurse Care Manager role. Agreed to CM follow-up outreach in the next week. CM initial assessment completed. 25 yo female presented to the [facility] ER on 22 for worsening shortness of breath wheezing and cough, symptoms progressing over the past 24 hrs. RA O2 Sats 89% lowest during rest;  BP on presentation 143/89 with tachycardia;  Lungs on exam noted diminished throughout with diffuse wheezing; Notes show patient remained borderline hypoxic throughout ER visit. Treated with IV Solumedrol, Nebulized albuterol; Discharge home on Prednisone and Albuterol inhaler;  Patient states she uses a nebulizer with Albuterol at home since discharge as well. ER Labs:   Resp Viral Panel +Rhinovirus and Enterovirus   D-Dimer   0.62 (H)   WBC     11. 3(H)   ABNs     8.6(H)    CT Chest negative Pulmonary Embolus;    PMH:  Asthma, Tachycardia;  21 +PE on Xarelto 6 mos;  +Family Hx:  Grandmother +PE; Great grandmother +PE; Cousin +PE;    Pulmonary Consult  · 21 Spirometry:  Mild airway obstruction probably at small airway level; Lung Age 27;    · PFTs order remains open; Hematology Consult for Hemophilia w/u  · Cardiolipin AB IgM elevated 14(H) on 21, WNL on 12/10/21  · Prolonged value dRVVT Mix of 40. 7(H)    Past Medical History:   Diagnosis Date    Asthma     Pulmonary emboli ([facility] [address] 2021    after long car ride       Respiratory Condition Focused Assessment  Supplemental oxygen use? no   Clean and working equipment? yes Nebulizer with albuterol at home  Oxygen settings- None   Cough reports resolved;     Patient reported important numbers to know:  Oxygen level Reports O2Sats today >94%   Temperature Reports afebrile   Description of any sputum Denies   Pain Denies   Weight BMI 52 Any change in activity level?  no  Any of the following? Shortness of breath or difficulty breathing Not in the past 24 hrs   Dizziness/lightheadedness no    Fever no    Low body temperature no    Chills or shaking no    Sweating no     Dyspnea on exertion no      Fatigue no      Anxiety Denies     Confusionno    Unexplained change in weight loss no    Sleep disturbance no      Incentive Spirometer? no    Does patient have action plan? yes Now with inhaler use    Medications:   New Medications at Discharge:   Prednisone, Albuterol inhaler; Changed Medications at Discharge:   none  Discontinued Medications at Discharge:  none    Barriers Identified / Support system:  patient and mother    Discharge Instructions:  Reviewed discharge instructions with patient. Patient verbalizes understanding of discharge instructions and importance of follow-up care. Barriers/Challenges to Care: [] Decline in memory    []Language barrier     []Emotional     []Limited mobility  []Lack of motivation       []Vision, hearing or cognitive impairment    [x]Knowledge    []Financial    []Lack of support  []Pain   []Other     []None    Potential Condition(s) or Impacts to ER visit:    · Hx Asthma, no inhalers, no rescue medication, no action plan    · Follow treatment plan of Pulmonary, attend regular FU visits  · +Family history of Clotting disorder, transient clotting risk   · Hematology workup - was there a need for daily ASA?   · See PCP for regular scheduled FU and care    Inpatient Readmission Risk score: No data recorded  Admission or ED Risk:  37%    Risk for Infection, steroids- immunosuppression risk  Impaired Gas Exchange, asthma exacerbation  Risk for Injury,  Significant Family hx clotting disorders    Goal:  Demonstrates self-management skills to decrease symptoms of asthma, lessen, prevent exacerbation;  Goal:  Demonstrates behaviors to prevent or lessen risk of infection;  · Reports working with her mother, an RN, to deep breath and be active today;  · Reports she is monitoring her breathing, using her inhaler and nebulizer since DC;  · Reports she does not have a FU appt - CM offered to make one, said her mom would handle;  · Reports her new PCP is Eliseo Martins MD, no visits, [facility] in January '22;  · States her mom is speaking with Dr Addis Omalley    · CM will encourage initial appointment with this PCP  · Elevated likelihood, would benefit to prevent further episodes  · Discuss if Hematology follow up needed; Potential Solutions to remove Barriers:  · Meet and develop a relationship with a PCP  · Follow up with Pulmonary or PCP regularly for inhalers, action plan, medication mgmt; Review and discussion of initial plan of care with patient, who has provided input to plan, verbalized understanding and agrees with current goals. Upcoming appointments: No future appointments. [facility]:  N/A    Discussed appropriate site of care needs based on patient's symptoms and resources available to patient at that time, including use of the Nurse Access Line at 6-544.329.4613 to identify appropriate level of care for current service needs and in-network care, such as Walk In or Urgent Care clinic, and [facility] online, when PCP or Specialist is unable to see patient, prior to going to the ER with lower level of care needs. Patient agrees to contact the PCP or Specialist's office for questions or follow up related to their healthcare or any urgent needs. Patient provided opportunity to ask further questions. No other clinical, social, or functional needs identified. Patient verbalized understanding of all information discussed. Patient received my contact information for any further questions, concerns, or needs.     Plan for follow-up call in 5-7 days to continue CM support for: self management-with an action plan with asthma, airway mgmt and follow up appointment-to see a PCP and come under a regular plan of care

## 2022-03-13 ENCOUNTER — PATIENT OUTREACH (OUTPATIENT)
Dept: OTHER | Age: 22
End: 2022-03-13

## 2022-03-19 PROBLEM — Z82.49 FAMILY HISTORY OF THROMBOSIS: Status: ACTIVE | Noted: 2021-09-03

## 2022-03-19 PROBLEM — I26.99 PULMONARY EMBOLISM (HCC): Status: ACTIVE | Noted: 2021-09-03

## 2022-03-20 ENCOUNTER — PATIENT OUTREACH (OUTPATIENT)
Dept: OTHER | Age: 22
End: 2022-03-20

## 2022-03-20 PROBLEM — E66.9 OBESITY: Status: ACTIVE | Noted: 2021-09-03

## 2022-03-20 NOTE — LETTER
Ms. Corona Larger  [facility]  [place] [id number]        Dear Ms. Evelyn Obrien,    My name is Jeramie Hoffmann RN, Associate Care Manager for [facility] and I have been trying to reach you since we first spoke following your Emergency Room visit. .     The Associate Care Management (ACM) program is a free-of-charge confidential service provided to our associates and their family members covered by the [facility]. The program will provide an associate and his/her family with the [facility] expertise to assist in navigating the health care delivery system, provider services, and their overall care needsso as to assure and improve health care interactions and enhance the quality of life. This program is designed to provide you with the opportunity to have a [address] [place] [address] partner with you for support, assistance, and education:     1) when you come home from the hospital or emergency room   2) when help is needed to manage your disease   3) when you need assistance coordinating services or appointments  4) when you need information about new illness, diagnosis, or medications  5) to reach Be Well Health Program criteria, such as Be Well With Diabetes      [facility] is dedicated to empowering the good health of its community and improving the quality of care and care experiences for associates and their families. We are committed to safeguarding patient confidentiality and privacy, assuring that every associate has the respect he or she deserves in managing their health. The information shared with your care manager will not be shared with anyone else aside from those you identify as part of your care team and will only be used to assist you with any identified care needs. Please contact me if you would like this service provided to you.        Sincerely,      SADIE Thomas, RN, [address] [place] [address], [address] [place] [address].   Tae Garcia [phone]   [phone]  [email or web address]

## 2022-03-21 NOTE — PROGRESS NOTES
Unable to reach patient on last follow up outreach call since her emergency room visit. No response from patient since last VM message. UTR letter sent to patient via [address] Will await follow up from patient.

## 2022-04-06 ENCOUNTER — PATIENT OUTREACH (OUTPATIENT)
Dept: OTHER | Age: 22
End: 2022-04-06

## 2022-04-07 NOTE — PROGRESS NOTES
Care Manager contacted the patient by telephone in follow up. Verified  and zip code with patient as identifiers. Patient has recovered completely following her viral URI and returned to her usual daily activities without difficulties. Resolving current episode of case management. Patient has met patient stated and/or medical goals. Goals Met with Plan of Care:   Demonstrates self-management skills to decrease symptoms of asthma, lessen, prevent exacerbation;  Demonstrates behaviors to prevent or lessen risk of infection;    Patient is aware she may reach out to this CM at any time in the future with any follow up CM needs or new concerns.

## 2023-05-25 RX ORDER — METHYLPREDNISOLONE 4 MG/1
TABLET ORAL
COMMUNITY
Start: 2022-01-03

## 2023-05-25 RX ORDER — CETIRIZINE HYDROCHLORIDE 10 MG/1
CAPSULE, LIQUID FILLED ORAL
COMMUNITY

## 2023-05-25 RX ORDER — ALBUTEROL SULFATE 90 UG/1
2 AEROSOL, METERED RESPIRATORY (INHALATION) EVERY 4 HOURS PRN
COMMUNITY
Start: 2022-03-01

## 2024-08-15 SDOH — HEALTH STABILITY: PHYSICAL HEALTH: ON AVERAGE, HOW MANY DAYS PER WEEK DO YOU ENGAGE IN MODERATE TO STRENUOUS EXERCISE (LIKE A BRISK WALK)?: 2 DAYS

## 2024-08-15 SDOH — HEALTH STABILITY: PHYSICAL HEALTH: ON AVERAGE, HOW MANY MINUTES DO YOU ENGAGE IN EXERCISE AT THIS LEVEL?: 30 MIN

## 2024-08-16 ENCOUNTER — OFFICE VISIT (OUTPATIENT)
Dept: FAMILY MEDICINE CLINIC | Facility: CLINIC | Age: 24
End: 2024-08-16
Payer: COMMERCIAL

## 2024-08-16 VITALS
OXYGEN SATURATION: 97 % | TEMPERATURE: 98.6 F | SYSTOLIC BLOOD PRESSURE: 136 MMHG | BODY MASS INDEX: 48.82 KG/M2 | HEIGHT: 65 IN | DIASTOLIC BLOOD PRESSURE: 84 MMHG | WEIGHT: 293 LBS | HEART RATE: 95 BPM

## 2024-08-16 DIAGNOSIS — Z00.00 ROUTINE PHYSICAL EXAMINATION: Primary | ICD-10-CM

## 2024-08-16 DIAGNOSIS — Z86.711 HISTORY OF PULMONARY EMBOLUS (PE): ICD-10-CM

## 2024-08-16 DIAGNOSIS — G89.29 CHRONIC LOW BACK PAIN WITHOUT SCIATICA, UNSPECIFIED BACK PAIN LATERALITY: ICD-10-CM

## 2024-08-16 DIAGNOSIS — I26.99 OTHER ACUTE PULMONARY EMBOLISM WITHOUT ACUTE COR PULMONALE (HCC): ICD-10-CM

## 2024-08-16 DIAGNOSIS — J45.20 MILD INTERMITTENT EXTRINSIC ASTHMA WITHOUT COMPLICATION: ICD-10-CM

## 2024-08-16 DIAGNOSIS — M54.50 CHRONIC LOW BACK PAIN WITHOUT SCIATICA, UNSPECIFIED BACK PAIN LATERALITY: ICD-10-CM

## 2024-08-16 PROCEDURE — 99385 PREV VISIT NEW AGE 18-39: CPT | Performed by: PHYSICIAN ASSISTANT

## 2024-08-16 RX ORDER — ALBUTEROL SULFATE 90 UG/1
2 AEROSOL, METERED RESPIRATORY (INHALATION) EVERY 4 HOURS PRN
Qty: 18 G | Refills: 3 | Status: SHIPPED | OUTPATIENT
Start: 2024-08-16

## 2024-08-16 SDOH — ECONOMIC STABILITY: FOOD INSECURITY: WITHIN THE PAST 12 MONTHS, THE FOOD YOU BOUGHT JUST DIDN'T LAST AND YOU DIDN'T HAVE MONEY TO GET MORE.: NEVER TRUE

## 2024-08-16 SDOH — ECONOMIC STABILITY: FOOD INSECURITY: WITHIN THE PAST 12 MONTHS, YOU WORRIED THAT YOUR FOOD WOULD RUN OUT BEFORE YOU GOT MONEY TO BUY MORE.: NEVER TRUE

## 2024-08-16 SDOH — ECONOMIC STABILITY: INCOME INSECURITY: HOW HARD IS IT FOR YOU TO PAY FOR THE VERY BASICS LIKE FOOD, HOUSING, MEDICAL CARE, AND HEATING?: NOT HARD AT ALL

## 2024-08-16 ASSESSMENT — PATIENT HEALTH QUESTIONNAIRE - PHQ9
SUM OF ALL RESPONSES TO PHQ QUESTIONS 1-9: 0
SUM OF ALL RESPONSES TO PHQ QUESTIONS 1-9: 0
1. LITTLE INTEREST OR PLEASURE IN DOING THINGS: NOT AT ALL
SUM OF ALL RESPONSES TO PHQ QUESTIONS 1-9: 0
SUM OF ALL RESPONSES TO PHQ9 QUESTIONS 1 & 2: 0
SUM OF ALL RESPONSES TO PHQ QUESTIONS 1-9: 0
2. FEELING DOWN, DEPRESSED OR HOPELESS: NOT AT ALL

## 2024-08-16 NOTE — PROGRESS NOTES
tightness and shortness of breath.    Cardiovascular:  Negative for chest pain, palpitations and leg swelling.   Neurological:  Negative for dizziness and headaches.      VITAL SIGNS:  Blood pressure 136/84, pulse 95, temperature 98.6 °F (37 °C), temperature source Temporal, height 1.651 m (5' 5\"), weight (!) 146.6 kg (323 lb 4 oz), last menstrual period 07/25/2024, SpO2 97%. Body mass index is 53.79 kg/m².     Physical Exam  Vitals reviewed.   Constitutional:       Appearance: Normal appearance. She is obese.   HENT:      Head: Normocephalic and atraumatic.      Right Ear: External ear normal.      Left Ear: External ear normal.   Eyes:      Conjunctiva/sclera: Conjunctivae normal.   Cardiovascular:      Rate and Rhythm: Normal rate and regular rhythm.      Heart sounds: Normal heart sounds. No murmur heard.  Pulmonary:      Effort: Pulmonary effort is normal.      Breath sounds: Normal breath sounds.   Skin:     General: Skin is warm and dry.   Neurological:      Mental Status: She is alert and oriented to person, place, and time.   Psychiatric:         Mood and Affect: Mood normal.         Behavior: Behavior normal.        ASSESSMENT AND PLAN:    Divina was seen today for new patient.    Diagnoses and all orders for this visit:    Routine physical examination    Mild intermittent extrinsic asthma without complication  -     albuterol sulfate HFA (PROVENTIL;VENTOLIN;PROAIR) 108 (90 Base) MCG/ACT inhaler; Inhale 2 puffs into the lungs every 4 hours as needed for Wheezing or Shortness of Breath    Body mass index (BMI) 50.0-59.9, adult (HCC)    Other acute pulmonary embolism without acute cor pulmonale (HCC)    History of pulmonary embolus (PE)  Comments:  2021- provoked, negative hypercoag work up.    Chronic low back pain without sciatica, unspecified back pain laterality  -     [facility]       She was counseled regarding the importance of eating a healthy diet composed of fresh

## 2024-08-22 ENCOUNTER — OFFICE VISIT (OUTPATIENT)
Dept: ORTHOPEDIC SURGERY | Age: 24
End: 2024-08-22
Payer: COMMERCIAL

## 2024-08-22 VITALS — WEIGHT: 293 LBS | HEIGHT: 65 IN | BODY MASS INDEX: 48.82 KG/M2

## 2024-08-22 DIAGNOSIS — G89.29 CHRONIC BILATERAL LOW BACK PAIN WITHOUT SCIATICA: Primary | ICD-10-CM

## 2024-08-22 DIAGNOSIS — M54.50 CHRONIC BILATERAL LOW BACK PAIN WITHOUT SCIATICA: Primary | ICD-10-CM

## 2024-08-22 PROCEDURE — 99204 OFFICE O/P NEW MOD 45 MIN: CPT | Performed by: PHYSICIAN ASSISTANT

## 2024-08-22 RX ORDER — MELOXICAM 15 MG/1
15 TABLET ORAL DAILY
Qty: 30 TABLET | Refills: 2 | Status: SHIPPED | OUTPATIENT
Start: 2024-08-22 | End: 2024-11-20

## 2024-08-22 NOTE — PROGRESS NOTES
looking at her x-rays today to be concerned about.  If she fails to gain meaningful improvement would recommend a lumbar MRI for further evaluation.  She can follow-up as needed.          4--this is an undiagnosed new problem with uncertain prognosis